# Patient Record
Sex: MALE | Race: OTHER | HISPANIC OR LATINO | URBAN - METROPOLITAN AREA
[De-identification: names, ages, dates, MRNs, and addresses within clinical notes are randomized per-mention and may not be internally consistent; named-entity substitution may affect disease eponyms.]

---

## 2017-04-18 ENCOUNTER — EMERGENCY (EMERGENCY)
Facility: HOSPITAL | Age: 37
LOS: 1 days | Discharge: HOME | End: 2017-04-18
Attending: EMERGENCY MEDICINE | Admitting: EMERGENCY MEDICINE
Payer: OTHER MISCELLANEOUS

## 2017-04-18 VITALS
OXYGEN SATURATION: 98 % | HEART RATE: 76 BPM | SYSTOLIC BLOOD PRESSURE: 124 MMHG | TEMPERATURE: 98 F | DIASTOLIC BLOOD PRESSURE: 78 MMHG | RESPIRATION RATE: 16 BRPM | WEIGHT: 229.94 LBS
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
## 2017-04-18 DIAGNOSIS — M25.562 PAIN IN LEFT KNEE: ICD-10-CM

## 2017-04-18 DIAGNOSIS — S89.92XA UNSPECIFIED INJURY OF LEFT LOWER LEG, INITIAL ENCOUNTER: ICD-10-CM
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
## 2017-04-18 PROCEDURE — 99283 EMERGENCY DEPT VISIT LOW MDM: CPT

## 2017-04-18 PROCEDURE — 73562 X-RAY EXAM OF KNEE 3: CPT | Mod: 26,LT

## 2017-04-18 PROCEDURE — 73562 X-RAY EXAM OF KNEE 3: CPT

## 2017-04-18 PROCEDURE — 99283 EMERGENCY DEPT VISIT LOW MDM: CPT | Mod: 25

## 2017-04-18 RX ORDER — IBUPROFEN 200 MG
600 TABLET ORAL ONCE
Qty: 0 | Refills: 0 | Status: COMPLETED | OUTPATIENT
Start: 2017-04-18 | End: 2017-04-18

## 2017-04-18 RX ADMIN — Medication 600 MILLIGRAM(S): at 12:19

## 2017-04-18 NOTE — ED ADULT TRIAGE NOTE - CHIEF COMPLAINT QUOTE
" I took a bad step and my left knee buckled while I was carrying a heavy milk crate thirty min ago"

## 2017-04-18 NOTE — ED PROVIDER NOTE - MEDICAL DECISION MAKING DETAILS
JR 35 yo otherwise healthy m presents to ed for left knee pain for the past few hours. Pt reports while carrying heavy crate, his left knee buckled and gave out.  Pt has since been having pain and discomfort worse with walking. Pt possibly with knee strain/sprain vs ligamentous injury.  Pt given motrin, xrays of left knee obtained, no fractures, pt placed in knee immobilizer and given crutches and ortho f/u

## 2017-04-18 NOTE — ED PROVIDER NOTE - MUSCULOSKELETAL, MLM
Spine appears normal, decreased rom of left knee secondary to discomfort, minimal sts of left knee, + pain to lef tknee w/ varus and valgus stress, left hip and ankle rom intact,  no muscle or joint tenderness

## 2017-04-18 NOTE — ED PROVIDER NOTE - OBJECTIVE STATEMENT
JR 35 yo otherwise healthy m presents to ed for left knee pain for the past few hours. Pt reports while carrying heavy crate, his left knee buckled and gave out.  Pt has since been having pain and discomfort worse with walking.  Pt otherwise denies any other musculoskeletal pain, numbness/tingling, weakness, fever

## 2019-12-24 ENCOUNTER — EMERGENCY (EMERGENCY)
Facility: HOSPITAL | Age: 39
LOS: 1 days | Discharge: HOME | End: 2019-12-24
Attending: EMERGENCY MEDICINE | Admitting: EMERGENCY MEDICINE
Payer: OTHER MISCELLANEOUS

## 2019-12-24 VITALS
HEART RATE: 70 BPM | SYSTOLIC BLOOD PRESSURE: 122 MMHG | OXYGEN SATURATION: 100 % | DIASTOLIC BLOOD PRESSURE: 78 MMHG | HEIGHT: 70 IN | RESPIRATION RATE: 16 BRPM | TEMPERATURE: 98 F | WEIGHT: 225.09 LBS

## 2019-12-24 VITALS
RESPIRATION RATE: 18 BRPM | DIASTOLIC BLOOD PRESSURE: 72 MMHG | TEMPERATURE: 98 F | HEART RATE: 76 BPM | OXYGEN SATURATION: 98 % | SYSTOLIC BLOOD PRESSURE: 128 MMHG
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
## 2019-12-24 DIAGNOSIS — S01.81XA LACERATION WITHOUT FOREIGN BODY OF OTHER PART OF HEAD, INITIAL ENCOUNTER: ICD-10-CM

## 2019-12-24 DIAGNOSIS — S01.111A LACERATION WITHOUT FOREIGN BODY OF RIGHT EYELID AND PERIOCULAR AREA, INITIAL ENCOUNTER: ICD-10-CM

## 2019-12-24 PROCEDURE — 70450 CT HEAD/BRAIN W/O DYE: CPT | Mod: 26

## 2019-12-24 PROCEDURE — 70486 CT MAXILLOFACIAL W/O DYE: CPT

## 2019-12-24 PROCEDURE — 99284 EMERGENCY DEPT VISIT MOD MDM: CPT

## 2019-12-24 PROCEDURE — 70450 CT HEAD/BRAIN W/O DYE: CPT

## 2019-12-24 PROCEDURE — 12051 INTMD RPR FACE/MM 2.5 CM/<: CPT

## 2019-12-24 PROCEDURE — 72125 CT NECK SPINE W/O DYE: CPT | Mod: 26

## 2019-12-24 PROCEDURE — 70486 CT MAXILLOFACIAL W/O DYE: CPT | Mod: 26

## 2019-12-24 PROCEDURE — 72125 CT NECK SPINE W/O DYE: CPT

## 2019-12-24 PROCEDURE — 99285 EMERGENCY DEPT VISIT HI MDM: CPT | Mod: 25

## 2019-12-24 RX ORDER — IBUPROFEN 200 MG
600 TABLET ORAL ONCE
Refills: 0 | Status: COMPLETED | OUTPATIENT
Start: 2019-12-24 | End: 2019-12-24

## 2019-12-24 RX ADMIN — Medication 600 MILLIGRAM(S): at 17:14

## 2019-12-24 RX ADMIN — Medication 600 MILLIGRAM(S): at 16:26

## 2019-12-24 NOTE — ED ADULT TRIAGE NOTE - BP NONINVASIVE DIASTOLIC (MM HG)
78 Cheek-To-Nose Interpolation Flap Text: A decision was made to reconstruct the defect utilizing an interpolation axial flap and a staged reconstruction.  A telfa template was made of the defect.  This telfa template was then used to outline the Cheek-To-Nose Interpolation flap.  The donor area for the pedicle flap was then injected with anesthesia.  The flap was excised through the skin and subcutaneous tissue down to the layer of the underlying musculature.  The interpolation flap was carefully excised within this deep plane to maintain its blood supply.  The edges of the donor site were undermined.   The donor site was closed in a primary fashion.  The pedicle was then rotated into position and sutured.  Once the tube was sutured into place, adequate blood supply was confirmed with blanching and refill.  The pedicle was then wrapped with xeroform gauze and dressed appropriately with a telfa and gauze bandage to ensure continued blood supply and protect the attached pedicle.

## 2019-12-24 NOTE — ED PROVIDER NOTE - CARE PROVIDER_API CALL
Jarrett Trevizo)  Plastic Surgery Surgery  [address] [place] [address], [address]  [place] [address]  Phone: [phone]  Fax: [phone]  Follow Up Time: 4-6 Days

## 2019-12-24 NOTE — ED PROVIDER NOTE - OBJECTIVE STATEMENT
38 y/o M, employee at the [employer], presents to the ED with laceration/puncture to the R malar region with noted swelling and hematoma. 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 Pt reports numbness to the R side of his face and a slight headache, but denies difficulty swallowing, visual changes, foreign body sensation in eye, or other injuries. Tetanus is UTD within the past 10 years.

## 2019-12-24 NOTE — ED ADULT NURSE REASSESSMENT NOTE - NS ED NURSE REASSESS COMMENT FT1
Patient a/oX 3, right facial swelling and pain complaint, no dizziness or nausea/vomitting.  Lacerations cleansed by Dr. Garland.  Vision acuity done.  Ibuprofen 600mg PO adminsitered.  Brought to CT scan in stable condition.  Plastic surgery consult pending.

## 2019-12-24 NOTE — ED PROVIDER NOTE - PATIENT PORTAL LINK FT
You can access the FollowMyHealth Patient Portal offered by [facility] by registering at the following website: [email or web address]. By joining [facility]s FollowMyHealth portal, you will also be able to view your health information using other applications (apps) compatible with our system.

## 2019-12-24 NOTE — ED PROVIDER NOTE - SKIN, MLM
Circular 0.5x0.5cm flat, tight laceration located infraorbitally to R eye, with soft tissue swelling, tenderness to palpation.

## 2019-12-24 NOTE — ED ADULT NURSE NOTE - OBJECTIVE STATEMENT
Patient states at work today about 30min ago a piece of metal fell on right side of face, arrives w/ small laceration under right eye and on right cheek, bleeding controlled, w/ right sided headache, no LOC, dizziness or blurring of vision.  Visual acuity done:  O.D  O.S  20/20 ;  O.D.  20/25 , Dr. Garland made aware.

## 2019-12-24 NOTE — CONSULT NOTE ADULT - ASSESSMENT
Urgent repair indicated in emergency department.  Parasthesias should be self limited, incomplete distribution of V2, no fracture through foramen.  Most probably secondary to impact of trauma, not direct laceration or fracture.

## 2019-12-24 NOTE — ED PROVIDER NOTE - CLINICAL SUMMARY MEDICAL DECISION MAKING FREE TEXT BOX
40 y/o M s/p facial trauma from sharp object presents with laceration/puncture wound to R infraorbital region. Plan for CT maxillofacial bones and c-spine. Vision in R eye 20/25, L eye 20/20, eye stained using tetracaine and fluorescein, no obvious signs of foreign bodies or hyphema; EOMI, no indirect evidence of intraocular pressure nausea exam, no ptosis. Will discuss case with plastics 38 y/o M s/p facial trauma from sharp object presents with laceration/puncture wound to R infraorbital region. Plan for CT maxillofacial bones and c-spine. Vision in R eye 20/25, L eye 20/20, eye stained using tetracaine and fluorescein, no obvious signs of foreign bodies or hyphema; EOMI, no indirect evidence of intraocular pressure on exam - sp lac repair by plastics  estrella well - no comps  may dc  no abx necessary per plastics

## 2019-12-24 NOTE — ED ADULT NURSE REASSESSMENT NOTE - NS ED NURSE REASSESS COMMENT FT1
Laceration repair done.  All symptoms resolved s/p meds, vital signs stable, discharged to home in stable condition.

## 2019-12-24 NOTE — CONSULT NOTE ADULT - SUBJECTIVE AND OBJECTIVE BOX
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  Patient caught the corner of large piece of metal to just above the right malar eminence and raised a flap of skin at the right lid-cheek junction.  Patient has been complaining of parasthesia of right cheek in incomplete distribution of right V2.  Denies dental parasthesia.  Normal occlusion.  Reported initial blurry vision to ER doc and visual acuity checked by ED doc and woods-lamp/comprehensive exam by ER doc perfomed prior to consultation.  No contributory medical or surgical history.  NKDA, non-smoker  CT Max-face negative.    On exam there are two lacerations adjacent to one another, the first is a semi-circular shaped flap elevated by tangential puncture injury, the base of this flap is at the lid-cheek junction.  Flap is about 1.5 cm and the corresponding length of the laceration is approx 2 cm.  Injury explored to base and does not communicate with infraorbital foramen.  No orbicularis exposure.  The flap is viable and should heal well.  Inferolateral to this is a 2 cm curvilinear laceration that is partial thickness superiorly but full thickness inferiorly.

## 2019-12-24 NOTE — ED PROVIDER NOTE - PROGRESS NOTE DETAILS
Dr Trevizo performed lac repair in ED-- will follow up with Dr Trevizo in 1 week  no obvious signs to suggest concussion facial numbness only- no numbness in teeth- low susp for facial nerve contusion/ injury   CT head maxillofacial and c spine neg for acute path - Dr Trevizo performed lac repair in ED-- will follow up with Dr Trevizo in 1 week  no obvious signs to suggest concussion facial numbness only-  likely neuorpraxia - dw dr trevizo-- no numbness in teeth- low susp for facial nerve contusion/ injury   CT head maxillofacial and c spine neg for acute path -

## 2020-05-04 NOTE — ED ADULT NURSE NOTE - NS TRANSFER DISPOSITION PATIENT BELONGINGS
with patient Doxycycline Pregnancy And Lactation Text: This medication is Pregnancy Category D and not consider safe during pregnancy. It is also excreted in breast milk but is considered safe for shorter treatment courses.